# Patient Record
Sex: MALE | URBAN - METROPOLITAN AREA
[De-identification: names, ages, dates, MRNs, and addresses within clinical notes are randomized per-mention and may not be internally consistent; named-entity substitution may affect disease eponyms.]

---

## 2020-06-05 ENCOUNTER — OFFICE VISIT (OUTPATIENT)
Dept: URBAN - METROPOLITAN AREA CLINIC 88 | Facility: CLINIC | Age: 35
End: 2020-06-05

## 2020-06-05 DIAGNOSIS — H11.041 PERIPHERAL PTERYGIUM, STATIONARY, RIGHT EYE: Primary | ICD-10-CM

## 2020-06-05 DIAGNOSIS — H11.152 PINGUECULA, LEFT EYE: Chronic | ICD-10-CM

## 2020-06-05 DIAGNOSIS — H04.123 DRY EYE SYNDROME OF BILATERAL LACRIMAL GLANDS: ICD-10-CM

## 2020-06-05 PROCEDURE — 99213 OFFICE O/P EST LOW 20 MIN: CPT | Performed by: OPTOMETRIST

## 2020-06-05 RX ORDER — OLOPATADINE HYDROCHLORIDE 2 MG/ML
0.2 % SOLUTION/ DROPS OPHTHALMIC
Qty: 1 | Refills: 6 | Status: ACTIVE
Start: 2020-06-05

## 2020-06-05 ASSESSMENT — INTRAOCULAR PRESSURE
OD: 13
OS: 14

## 2020-06-05 ASSESSMENT — KERATOMETRY
OS: 43.13
OD: 43.38

## 2020-06-05 NOTE — IMPRESSION/PLAN
Impression: Dry eye syndrome of bilateral lacrimal glands: H04.123. Plan: Discussed chronic nature of condition in detail with patient. Explained condition does not have a cure and will need artificial tears for maintenance. Recommended artificial tears (Refresh or Systane brand) QID OU for continuous maintenance of tear film.

## 2020-06-05 NOTE — IMPRESSION/PLAN
Impression: Peripheral pterygium, stationary, right eye: H11.041. Plan:  Patient educated to condition. Artificial tears and sunglasses with UV protection were recommended. Patient knows to RTC if redness or irritation are experienced. Rx Pataday QD OU to help reduce redness. Discussed option of surgery if condition progresses or does not improve.